# Patient Record
(demographics unavailable — no encounter records)

---

## 2025-02-25 NOTE — CARDIOLOGY SUMMARY
[de-identified] : February 25, 2025.  Normal sinus rhythm.  PVC.  Ventricular couplet.  Normal intervals.

## 2025-02-25 NOTE — ASSESSMENT
[FreeTextEntry1] : Reviewed on February 25, 2025.  EKG from today reviewed.  EKG from December 13 outside office was reviewed. Labs last available are from March 12, 2024 were reviewed.

## 2025-02-25 NOTE — PHYSICAL EXAM
[Well Developed] : well developed [No Acute Distress] : no acute distress [Normal Venous Pressure] : normal venous pressure [No Carotid Bruit] : no carotid bruit [Normal S1, S2] : normal S1, S2 [No Murmur] : no murmur [No Rub] : no rub [Clear Lung Fields] : clear lung fields [Normal Gait] : normal gait [No Edema] : no edema [No Cyanosis] : no cyanosis [Normal Radial B/L] : normal radial B/L [Normal DP B/L] : normal DP B/L [Normal Speech] : normal speech [Alert and Oriented] : alert and oriented

## 2025-02-25 NOTE — CARDIOLOGY SUMMARY
[de-identified] : February 25, 2025.  Normal sinus rhythm.  PVC.  Ventricular couplet.  Normal intervals.

## 2025-02-25 NOTE — REASON FOR VISIT
[Arrhythmia/ECG Abnorrmalities] : arrhythmia/ECG abnormalities [FreeTextEntry3] : Dr. Josue  [FreeTextEntry1] : 48-year-old white female is referred to me for consultation in presence of Last couple of months of increasing throbbing sensation in her neck and irregular pulse. No significant chest pain or unusual shortness of breath. No near syncopal or syncopal event. No significant new medication use.  No antihistamines or cough or cold medications.  No stimulants. She had gone to walk-in clinic where EKG was done on February 13, 2025 which did not reveal any significant abnormality.  She has no prior history of hypertension, diabetes mellitus, myocardial infarction, coronary artery disease, cerebrovascular accident, peripheral artery disease, rheumatic fever, thyroid or Lyme disease. She does have family history of CAD with father having CABG and permanent pacemaker.

## 2025-02-25 NOTE — REASON FOR VISIT
[Arrhythmia/ECG Abnorrmalities] : arrhythmia/ECG abnormalities [FreeTextEntry3] : Dr. oJsue  [FreeTextEntry1] : 48-year-old white female is referred to me for consultation in presence of Last couple of months of increasing throbbing sensation in her neck and irregular pulse. No significant chest pain or unusual shortness of breath. No near syncopal or syncopal event. No significant new medication use.  No antihistamines or cough or cold medications.  No stimulants. She had gone to walk-in clinic where EKG was done on February 13, 2025 which did not reveal any significant abnormality.  She has no prior history of hypertension, diabetes mellitus, myocardial infarction, coronary artery disease, cerebrovascular accident, peripheral artery disease, rheumatic fever, thyroid or Lyme disease. She does have family history of CAD with father having CABG and permanent pacemaker.

## 2025-02-25 NOTE — DISCUSSION/SUMMARY
[FreeTextEntry1] : 48-year-old with above medical history active medical problems as noted below 1.  Symptomatic frequent PVC.  Normal intervals.  No recent labs.  No syncopal event.  Recommendations Repeat labs including potassium magnesium TSH Lyme titers Echocardiogram for LV RV function evaluation considering frequent PVCs it is important to rule out any structural heart disease Event monitor to evaluate for PVC burden. Exercise treadmill stress test to assess evaluate and rule out exercise-induced arrhythmia as well as any underlying ischemic heart disease. Metoprolol succinate 25 mg half a tablet a day was added.  Risk benefits alternatives side effects are understood and reviewed.  As needed this can be further maximized Continue lower caffeine and alcohol intake. Continue regular activity and exercise unless symptoms associated with exercise then to wait for exercise treadmill stress test before starting any aggressive exercises. Stress and anxiety management. 2.  Cardiac restratification.  Fasting lipid panel ordered.  Based on about testing we will discuss further any indication for further cardiovascular testing.  Counseling regarding low saturated fat,salt and carbohydrate intake was reviewed. Active lifestyle and regular exercise  along with weight management is advised. I have reviewed above at length. I answered all the questions. Patient verbalized understandings. Thank you very much for allowing me to participate in your patient's care. Please feel free to call me for any questions.  Sincerely,  La Arriaga MD, FACC, JENAE [EKG obtained to assist in diagnosis and management of assessed problem(s)] : EKG obtained to assist in diagnosis and management of assessed problem(s)

## 2025-05-01 NOTE — REASON FOR VISIT
[Arrhythmia/ECG Abnorrmalities] : arrhythmia/ECG abnormalities [FreeTextEntry3] : Dr. Josue  [FreeTextEntry1] : 48-year-old white female is here for fu after seeing EP and have ETT diagnosis of RVOT PVCs/NSVT.  Also worsening with hormone replacement for postmenopausal symptoms.  Hormone therapy was discontinued with improved symptoms.  There was no response to beta-blockers.  Prescribed diltiazem which she has not started.  According to her her symptoms are postmenopausal sweating hot flashes have returned.  She would like to go back to hormone replacement therapy.  Other than that offers no new symptoms. As you know she was referred to me for consultation in presence of Last couple of months of increasing throbbing sensation in her neck and irregular pulse. No significant chest pain or unusual shortness of breath. No near syncopal or syncopal event. No significant new medication use.  No antihistamines or cough or cold medications.  No stimulants. She had gone to walk-in clinic where EKG was done on February 13, 2025 which did not reveal any significant abnormality.  She has no prior history of hypertension, diabetes mellitus, myocardial infarction, coronary artery disease, cerebrovascular accident, peripheral artery disease, rheumatic fever, thyroid or Lyme disease. She does have family history of CAD with father having CABG and permanent pacemaker.

## 2025-05-01 NOTE — PHYSICAL EXAM
[No Acute Distress] : no acute distress [Normal S1, S2] : normal S1, S2 [Clear Lung Fields] : clear lung fields [No Respiratory Distress] : no respiratory distress  [de-identified] : 108/68 right upper extremity, heart rate 89, weight 255 pounds, 96% room air saturation.

## 2025-05-01 NOTE — DISCUSSION/SUMMARY
[FreeTextEntry1] : 48-year-old with above medical history active medical problems as noted below 1.  Symptomatic frequent PVC.  Normal intervals.  No syncopal event.  RVOT PVCs.  Sensitive to hormone replacement.  Considering she does need to go back to hormone replacement because of significant quality-of-life altering symptoms related to postmenopausal state recommend to start diltiazem as advised by EP.  If continued symptomatic PVCs further evaluation management would include RVOT PVC ablation.  Continue lower caffeine and alcohol intake and regular exercise program. 2.  Equivocal ETT.  Asymptomatic" more than 10 METS of workload.  Preserved EF.  No other risk factors.  Continue monitoring on medical management with lifestyle modifications.  At present decided against any other cardiac evaluation. 3.  Lipid panel reviewed.  Low 10-year ASCVD risk.  Lifestyle modifications reviewed.  Try to lower LDL cholesterol to less than 100.  If persistent we can consider further evaluation with coronary calcium score to guide long-term management. 4.  Questionable PFO.  No chamber enlargement.  No significant shunting on echocardiogram.  Continue to follow.No history of neurological event. aspirin 81 mg  5.  Abnormal Lyme titers with IgG elevation.  She has had history of Lyme disease.  Follow-up with PMD.  Counseling regarding low saturated fat,salt and carbohydrate intake was reviewed. Active lifestyle and regular exercise  along with weight management is advised. I have reviewed above at length. I answered all the questions. Patient verbalized understandings. Thank you very much for allowing me to participate in your patient's care. Please feel free to call me for any questions.  Sincerely,  La Arriaga MD, FACC, JENAE

## 2025-05-01 NOTE — CARDIOLOGY SUMMARY
[de-identified] : February 25, 2025.  Normal sinus rhythm.  PVC.  Ventricular couplet.  Normal intervals. [de-identified] : Event monitor.  Showed 22.6% PVC burden.  March 2025. [de-identified] : Exercise treadmill stress test.  April 30, 2025.  Equivocal at high level of workload.  PVCs and ventricular couplets.  No chest pain induced. [de-identified] : Echocardiogram.  March 12, 2025.  Trace to mild TR/AR/MR.  Questionable PFO.

## 2025-05-01 NOTE — ASSESSMENT
[FreeTextEntry1] : Reviewed on February 25, 2025.  EKG from today reviewed.  EKG from December 13 outside office was reviewed. Labs last available are from March 12, 2024 were reviewed.  April 30, 2025. ETT, echocardiogram, event monitor, EP consultations were all reviewed.Reviewed on Labs on February 2025  HDL 66 triglycerides 66 creatinine 0.65 sodium 140 potassium 5.1 TSH normal Lyme titers abnormal with IgG.  CBC stable